# Patient Record
Sex: FEMALE | URBAN - METROPOLITAN AREA
[De-identification: names, ages, dates, MRNs, and addresses within clinical notes are randomized per-mention and may not be internally consistent; named-entity substitution may affect disease eponyms.]

---

## 2022-01-21 ENCOUNTER — OFFICE VISIT (OUTPATIENT)
Dept: URBAN - METROPOLITAN AREA CLINIC 128 | Facility: CLINIC | Age: 67
End: 2022-01-21

## 2022-02-08 ENCOUNTER — OFFICE VISIT (OUTPATIENT)
Dept: URBAN - METROPOLITAN AREA CLINIC 19 | Facility: CLINIC | Age: 67
End: 2022-02-08
Payer: COMMERCIAL

## 2022-02-08 VITALS
BODY MASS INDEX: 25.72 KG/M2 | WEIGHT: 154.4 LBS | SYSTOLIC BLOOD PRESSURE: 130 MMHG | HEART RATE: 92 BPM | OXYGEN SATURATION: 94 % | DIASTOLIC BLOOD PRESSURE: 82 MMHG | TEMPERATURE: 97.5 F | HEIGHT: 65 IN

## 2022-02-08 DIAGNOSIS — K31.7 GASTRIC POLYPS: ICD-10-CM

## 2022-02-08 DIAGNOSIS — D50.0 ANEMIA DUE TO CHRONIC BLOOD LOSS: ICD-10-CM

## 2022-02-08 DIAGNOSIS — Z86.010 HISTORY OF COLON POLYPS: ICD-10-CM

## 2022-02-08 PROBLEM — 428283002: Status: ACTIVE | Noted: 2022-02-08

## 2022-02-08 PROBLEM — 78809005: Status: ACTIVE | Noted: 2022-02-08

## 2022-02-08 PROCEDURE — 99204 OFFICE O/P NEW MOD 45 MIN: CPT | Performed by: INTERNAL MEDICINE

## 2022-02-08 RX ORDER — ACYCLOVIR 400 MG/1
1 TABLET TABLET ORAL TWICE A DAY
Status: ACTIVE | COMMUNITY

## 2022-02-08 RX ORDER — ESTRADIOL 10 UG/1
1 TABLET INSERT VAGINAL
Status: ACTIVE | COMMUNITY

## 2022-02-08 RX ORDER — IRBESARTAN 75 MG/1
1 TABLET TABLET, FILM COATED ORAL ONCE A DAY
Status: ACTIVE | COMMUNITY

## 2022-02-08 RX ORDER — TRAMADOL HYDROCHLORIDE AND ACETAMINOPHEN 37.5; 325 MG/1; MG/1
2 TABLETS AS NEEDED TABLET ORAL
Status: ACTIVE | COMMUNITY

## 2022-02-08 NOTE — HPI-TODAY'S VISIT:
is a 66 yr old woman new patient referred by Dr. Shae Benavides for anemia. She has had rheumatoid arthritis for 20 years and is on Embrel. Recent CBC showed Hb of 10.3. MCV 80, normal B12 and folate, no iron studies.  Prior hx of right hemicolectomy in 2004 for large benign colon polyp.  EGD 2014- multiple small gastric polyps throughout with cofee ground material in the stomach.  Colonoscopy - 3 mm single ulcer in karlos termina lileum, normal colon otherwise.  Both by Dr.Alan Henson.  has been on ultracet for a long time and has difficulty prepping for oclon with fair preps- take sa long time for her to clean out. no fever/no chills/no weight loss

## 2022-03-11 PROBLEM — 413533008: Status: ACTIVE | Noted: 2022-02-08

## 2022-03-25 ENCOUNTER — OFFICE VISIT (OUTPATIENT)
Dept: URBAN - METROPOLITAN AREA SURGERY CENTER 31 | Facility: SURGERY CENTER | Age: 67
End: 2022-03-25
Payer: COMMERCIAL

## 2022-03-25 ENCOUNTER — CLAIMS CREATED FROM THE CLAIM WINDOW (OUTPATIENT)
Dept: URBAN - METROPOLITAN AREA CLINIC 4 | Facility: CLINIC | Age: 67
End: 2022-03-25
Payer: COMMERCIAL

## 2022-03-25 DIAGNOSIS — K31.89 FOCAL FOVEOLAR HYPERPLASIA: ICD-10-CM

## 2022-03-25 DIAGNOSIS — K31.7 POLYP OF STOMACH AND DUODENUM: ICD-10-CM

## 2022-03-25 DIAGNOSIS — K31.7 BENIGN GASTRIC POLYP: ICD-10-CM

## 2022-03-25 DIAGNOSIS — K31.89 ACQUIRED DEFORMITY OF DUODENUM: ICD-10-CM

## 2022-03-25 DIAGNOSIS — D50.9 ANEMIA: ICD-10-CM

## 2022-03-25 PROCEDURE — 88312 SPECIAL STAINS GROUP 1: CPT | Performed by: PATHOLOGY

## 2022-03-25 PROCEDURE — G8907 PT DOC NO EVENTS ON DISCHARG: HCPCS | Performed by: INTERNAL MEDICINE

## 2022-03-25 PROCEDURE — 88305 TISSUE EXAM BY PATHOLOGIST: CPT | Performed by: PATHOLOGY

## 2022-03-25 PROCEDURE — 45378 DIAGNOSTIC COLONOSCOPY: CPT | Performed by: INTERNAL MEDICINE

## 2022-03-25 PROCEDURE — 43239 EGD BIOPSY SINGLE/MULTIPLE: CPT | Performed by: INTERNAL MEDICINE

## 2022-03-25 RX ORDER — ESTRADIOL 10 UG/1
1 TABLET INSERT VAGINAL
Status: ACTIVE | COMMUNITY

## 2022-03-25 RX ORDER — IRBESARTAN 75 MG/1
1 TABLET TABLET, FILM COATED ORAL ONCE A DAY
Status: ACTIVE | COMMUNITY

## 2022-03-25 RX ORDER — ACYCLOVIR 400 MG/1
1 TABLET TABLET ORAL TWICE A DAY
Status: ACTIVE | COMMUNITY

## 2022-03-25 RX ORDER — TRAMADOL HYDROCHLORIDE AND ACETAMINOPHEN 37.5; 325 MG/1; MG/1
2 TABLETS AS NEEDED TABLET ORAL
Status: ACTIVE | COMMUNITY

## 2022-04-19 ENCOUNTER — DASHBOARD ENCOUNTERS (OUTPATIENT)
Age: 67
End: 2022-04-19

## 2022-04-19 ENCOUNTER — WEB ENCOUNTER (OUTPATIENT)
Dept: URBAN - METROPOLITAN AREA CLINIC 19 | Facility: CLINIC | Age: 67
End: 2022-04-19

## 2022-04-19 ENCOUNTER — OFFICE VISIT (OUTPATIENT)
Dept: URBAN - METROPOLITAN AREA CLINIC 19 | Facility: CLINIC | Age: 67
End: 2022-04-19
Payer: COMMERCIAL

## 2022-04-19 ENCOUNTER — LAB OUTSIDE AN ENCOUNTER (OUTPATIENT)
Dept: URBAN - METROPOLITAN AREA CLINIC 19 | Facility: CLINIC | Age: 67
End: 2022-04-19

## 2022-04-19 DIAGNOSIS — D50.9 IRON DEFICIENCY ANEMIA, UNSPECIFIED IRON DEFICIENCY ANEMIA TYPE: ICD-10-CM

## 2022-04-19 PROCEDURE — 99213 OFFICE O/P EST LOW 20 MIN: CPT | Performed by: NURSE PRACTITIONER

## 2022-04-19 RX ORDER — ESTRADIOL 10 UG/1
1 TABLET INSERT VAGINAL
Status: ACTIVE | COMMUNITY

## 2022-04-19 RX ORDER — TRAMADOL HYDROCHLORIDE AND ACETAMINOPHEN 37.5; 325 MG/1; MG/1
2 TABLETS AS NEEDED TABLET ORAL
Status: ACTIVE | COMMUNITY

## 2022-04-19 RX ORDER — ACYCLOVIR 400 MG/1
1 TABLET TABLET ORAL TWICE A DAY
Status: ACTIVE | COMMUNITY

## 2022-04-19 RX ORDER — IRBESARTAN 75 MG/1
1 TABLET TABLET, FILM COATED ORAL ONCE A DAY
Status: ACTIVE | COMMUNITY

## 2022-04-19 NOTE — HPI-TODAY'S VISIT:
2/8/22 Dr. Noriega: is a 66 yr old woman new patient referred by Dr. Shae Benavides for anemia. She has had rheumatoid arthritis for 20 years and is on Embrel. Recent CBC showed Hb of 10.3. MCV 80, normal B12 and folate, no iron studies.  Prior hx of right hemicolectomy in 2004 for large benign colon polyp.  EGD 2014- multiple small gastric polyps throughout with cofee ground material in the stomach.  Colonoscopy - 3 mm single ulcer in karlos termina lileum, normal colon otherwise.  Both by Dr.Alan Henson.  has been on ultracet for a long time and has difficulty prepping for oclon with fair preps- take sa long time for her to clean out.  Colonoscopy with Dr. Noriega 3/25/2022-preparation of the colon was fair.  Patent end-to-side ileocolonic anastomosis, characterized by healthy-appearing mucosa.  Diverticulosis in sigmoid colon.  The distal rectum and anal verge are normal on retroflexion view.  Repeat colonoscopy in 3 years.  See with PillCam if pathology normal.  EGD with Dr. Noriega 3/25/2022-normal esophagus, multiple gastric polyps, chronic nonerosive gastritis, and normal examined duodenum.    Pathology-biopsy of stomach and duodenum with no significant abnormality.  Stomach polyp was a fundic gland polyp.  4/19/22: Patient reports DEBRA for 6 months now. Advised to hold off on iron until after procedures. Patient states she had labs checked right before procedures in March. No blood in stool or black stool. Patient reports she is having a bowel movement 5 times a day week. No blood in stool or abdominal pain. On miralax and fiber daily.

## 2022-05-04 ENCOUNTER — OFFICE VISIT (OUTPATIENT)
Dept: URBAN - METROPOLITAN AREA CLINIC 127 | Facility: CLINIC | Age: 67
End: 2022-05-04

## 2022-05-06 ENCOUNTER — TELEPHONE ENCOUNTER (OUTPATIENT)
Dept: URBAN - METROPOLITAN AREA CLINIC 19 | Facility: CLINIC | Age: 67
End: 2022-05-06

## 2022-05-09 PROBLEM — 87522002: Status: ACTIVE | Noted: 2022-04-19

## 2022-05-11 ENCOUNTER — OFFICE VISIT (OUTPATIENT)
Dept: URBAN - METROPOLITAN AREA CLINIC 127 | Facility: CLINIC | Age: 67
End: 2022-05-11

## 2022-05-25 ENCOUNTER — OFFICE VISIT (OUTPATIENT)
Dept: URBAN - METROPOLITAN AREA CLINIC 127 | Facility: CLINIC | Age: 67
End: 2022-05-25
Payer: COMMERCIAL

## 2022-05-25 DIAGNOSIS — D50.9 IRON DEFICIENCY ANEMIA: ICD-10-CM

## 2022-05-25 PROCEDURE — 91110 GI TRC IMG INTRAL ESOPH-ILE: CPT | Performed by: STUDENT IN AN ORGANIZED HEALTH CARE EDUCATION/TRAINING PROGRAM

## 2022-05-26 ENCOUNTER — TELEPHONE ENCOUNTER (OUTPATIENT)
Dept: URBAN - METROPOLITAN AREA CLINIC 92 | Facility: CLINIC | Age: 67
End: 2022-05-26

## 2022-06-20 ENCOUNTER — TELEPHONE ENCOUNTER (OUTPATIENT)
Dept: URBAN - METROPOLITAN AREA CLINIC 19 | Facility: CLINIC | Age: 67
End: 2022-06-20

## 2022-06-22 PROBLEM — 87522002 IRON DEFICIENCY ANEMIA: Status: ACTIVE | Noted: 2022-06-22

## 2022-12-29 ENCOUNTER — TELEPHONE ENCOUNTER (OUTPATIENT)
Dept: URBAN - METROPOLITAN AREA CLINIC 19 | Facility: CLINIC | Age: 67
End: 2022-12-29

## 2024-10-28 ENCOUNTER — OFFICE VISIT (OUTPATIENT)
Dept: URBAN - METROPOLITAN AREA CLINIC 2 | Facility: CLINIC | Age: 69
End: 2024-10-28
Payer: MEDICARE

## 2024-10-28 VITALS
TEMPERATURE: 97 F | BODY MASS INDEX: 26.39 KG/M2 | HEIGHT: 65 IN | WEIGHT: 158.4 LBS | HEART RATE: 74 BPM | DIASTOLIC BLOOD PRESSURE: 85 MMHG | SYSTOLIC BLOOD PRESSURE: 150 MMHG

## 2024-10-28 DIAGNOSIS — K21.9 CHRONIC GERD: ICD-10-CM

## 2024-10-28 PROBLEM — 235595009: Status: ACTIVE | Noted: 2024-10-28

## 2024-10-28 PROCEDURE — 99213 OFFICE O/P EST LOW 20 MIN: CPT | Performed by: NURSE PRACTITIONER

## 2024-10-28 RX ORDER — ETANERCEPT 50 MG/ML
SOLUTION SUBCUTANEOUS
Qty: 4 MILLILITER | Status: ACTIVE | COMMUNITY

## 2024-10-28 RX ORDER — TRAMADOL HYDROCHLORIDE AND ACETAMINOPHEN 37.5; 325 MG/1; MG/1
2 TABLETS AS NEEDED TABLET ORAL
Status: DISCONTINUED | COMMUNITY

## 2024-10-28 RX ORDER — ACYCLOVIR 400 MG/1
1 TABLET TABLET ORAL TWICE A DAY
Status: ACTIVE | COMMUNITY

## 2024-10-28 RX ORDER — SIMVASTATIN 40 MG/1
TABLET, FILM COATED ORAL
Qty: 90 TABLET | Status: ACTIVE | COMMUNITY

## 2024-10-28 RX ORDER — OMEPRAZOLE 40 MG/1
CAPSULE, DELAYED RELEASE ORAL
Qty: 90 CAPSULE | Status: ACTIVE | COMMUNITY

## 2024-10-28 RX ORDER — ESTRADIOL 10 UG/1
1 TABLET INSERT VAGINAL
Status: ACTIVE | COMMUNITY

## 2024-10-28 RX ORDER — ESTRADIOL 10 UG/1
TABLET VAGINAL
Qty: 36 TABLET | Status: ACTIVE | COMMUNITY

## 2024-10-28 RX ORDER — IRBESARTAN 75 MG/1
1 TABLET TABLET, FILM COATED ORAL ONCE A DAY
Status: ACTIVE | COMMUNITY

## 2024-10-28 RX ORDER — FAMOTIDINE 40 MG/1
1 TABLET AT BEDTIME TABLET, FILM COATED ORAL ONCE A DAY
Qty: 30 TABLET | Refills: 3 | OUTPATIENT
Start: 2024-10-28

## 2024-10-28 RX ORDER — METRONIDAZOLE 7.5 MG/G
GEL TOPICAL
Qty: 45 GRAM | Status: ACTIVE | COMMUNITY

## 2024-10-28 NOTE — HPI-TODAY'S VISIT:
VeryPleasant 69-year-old female seen today with complaints of heartburn and reflux.  Patient does have a history of anemia and did have an EGD in March 2022 that was notable for normal esophagus, multiple fundic gland polyps, chronic nonerosive gastritis, and a normal duodenum.  Patient has done well from a GI perspective since her last office visit in 2022 until about 10 days ago.  She was having worsening heartburn and reflux and was not able to sleep lying flat.  She also developed a sore throat last Wednesday.  She had an atypical mole removed from her toe and was given Keflex.  Since being on this antibiotic the sore throat resolved.  Patient has gained about 10 pounds and has been eating more sweets recently.  She had been taking omeprazole 40 mg every morning.  However in the past 10 days she is taking increasing amounts of Gaviscon.  Symptoms have been slowly improving.  She has been told that she has a small hiatal hernia.

## 2024-12-02 ENCOUNTER — OFFICE VISIT (OUTPATIENT)
Dept: URBAN - METROPOLITAN AREA CLINIC 2 | Facility: CLINIC | Age: 69
End: 2024-12-02
Payer: MEDICARE

## 2024-12-02 VITALS
HEIGHT: 65 IN | TEMPERATURE: 97.5 F | BODY MASS INDEX: 26.33 KG/M2 | WEIGHT: 158 LBS | SYSTOLIC BLOOD PRESSURE: 129 MMHG | DIASTOLIC BLOOD PRESSURE: 79 MMHG | HEART RATE: 73 BPM

## 2024-12-02 DIAGNOSIS — K21.9 CHRONIC GERD: ICD-10-CM

## 2024-12-02 PROCEDURE — 99213 OFFICE O/P EST LOW 20 MIN: CPT | Performed by: NURSE PRACTITIONER

## 2024-12-02 RX ORDER — ESTRADIOL 10 UG/1
TABLET VAGINAL
Qty: 36 TABLET | Status: ACTIVE | COMMUNITY

## 2024-12-02 RX ORDER — FAMOTIDINE 40 MG/1
1 TABLET AT BEDTIME TABLET, FILM COATED ORAL ONCE A DAY
Qty: 30 TABLET | Refills: 3 | Status: ACTIVE | COMMUNITY
Start: 2024-10-28

## 2024-12-02 RX ORDER — METRONIDAZOLE 7.5 MG/G
GEL TOPICAL
Qty: 45 GRAM | Status: ACTIVE | COMMUNITY

## 2024-12-02 RX ORDER — ESTRADIOL 10 UG/1
1 TABLET INSERT VAGINAL
Status: ACTIVE | COMMUNITY

## 2024-12-02 RX ORDER — ETANERCEPT 50 MG/ML
SOLUTION SUBCUTANEOUS
Qty: 4 MILLILITER | Status: ACTIVE | COMMUNITY

## 2024-12-02 RX ORDER — SIMVASTATIN 40 MG/1
TABLET, FILM COATED ORAL
Qty: 90 TABLET | Status: ACTIVE | COMMUNITY

## 2024-12-02 RX ORDER — ACYCLOVIR 400 MG/1
1 TABLET TABLET ORAL TWICE A DAY
Status: ACTIVE | COMMUNITY

## 2024-12-02 RX ORDER — OMEPRAZOLE 40 MG/1
CAPSULE, DELAYED RELEASE ORAL
Qty: 90 CAPSULE | Status: ACTIVE | COMMUNITY

## 2024-12-02 RX ORDER — IRBESARTAN 75 MG/1
1 TABLET TABLET, FILM COATED ORAL ONCE A DAY
Status: ACTIVE | COMMUNITY

## 2024-12-02 RX ORDER — FAMOTIDINE 40 MG/1
1 TABLET AT BEDTIME TABLET, FILM COATED ORAL ONCE A DAY
Qty: 30 TABLET | Refills: 3 | OUTPATIENT

## 2024-12-02 NOTE — HPI-TODAY'S VISIT:
69-year-old female seen in follow-up today after a visit in October with worsening heartburn and reflux.  She does have a history of chronic nonerosive gastritis.  She takes omeprazole.  Patient had been on an antibiotic prior to onset of symptoms.  Since then heartburn and reflux have been poorly controlled and she was taking Gaviscon to help with her symptoms.  At her visit in October we continued omeprazole but did add famotidine at bedtime.  She had been gaining weight so we suggested losing 5 to 10 pounds.  Patient is seen in follow-up today. She continues to have poorly controlled gerd. despite dual therapy. Denies dysphagia. Does not take nsaids.

## 2025-01-17 ENCOUNTER — OFFICE VISIT (OUTPATIENT)
Dept: URBAN - METROPOLITAN AREA CLINIC 128 | Facility: CLINIC | Age: 70
End: 2025-01-17

## 2025-01-24 ENCOUNTER — OFFICE VISIT (OUTPATIENT)
Dept: URBAN - METROPOLITAN AREA CLINIC 128 | Facility: CLINIC | Age: 70
End: 2025-01-24
Payer: MEDICARE

## 2025-01-24 VITALS
HEIGHT: 65 IN | WEIGHT: 156 LBS | DIASTOLIC BLOOD PRESSURE: 62 MMHG | TEMPERATURE: 97 F | SYSTOLIC BLOOD PRESSURE: 116 MMHG | BODY MASS INDEX: 25.99 KG/M2 | HEART RATE: 82 BPM

## 2025-01-24 DIAGNOSIS — K21.9 CHRONIC GERD: ICD-10-CM

## 2025-01-24 PROCEDURE — 99213 OFFICE O/P EST LOW 20 MIN: CPT | Performed by: INTERNAL MEDICINE

## 2025-01-24 RX ORDER — ESTRADIOL 10 UG/1
1 TABLET INSERT VAGINAL
Status: ACTIVE | COMMUNITY

## 2025-01-24 RX ORDER — SIMVASTATIN 40 MG/1
TABLET, FILM COATED ORAL
Qty: 90 TABLET | Status: ACTIVE | COMMUNITY

## 2025-01-24 RX ORDER — FAMOTIDINE 40 MG/1
1 TABLET AT BEDTIME TABLET, FILM COATED ORAL ONCE A DAY
Qty: 30 TABLET | Refills: 3 | OUTPATIENT

## 2025-01-24 RX ORDER — ACYCLOVIR 400 MG/1
1 TABLET TABLET ORAL TWICE A DAY
Status: ACTIVE | COMMUNITY

## 2025-01-24 RX ORDER — FAMOTIDINE 40 MG/1
1 TABLET AT BEDTIME TABLET, FILM COATED ORAL ONCE A DAY
Qty: 30 TABLET | Refills: 3 | Status: ACTIVE | COMMUNITY

## 2025-01-24 RX ORDER — IRBESARTAN 75 MG/1
1 TABLET TABLET, FILM COATED ORAL ONCE A DAY
Status: ACTIVE | COMMUNITY

## 2025-01-24 RX ORDER — OMEPRAZOLE 40 MG/1
CAPSULE, DELAYED RELEASE ORAL
Qty: 90 CAPSULE | Status: ACTIVE | COMMUNITY

## 2025-01-24 RX ORDER — ESTRADIOL 10 UG/1
TABLET VAGINAL
Qty: 36 TABLET | Status: ACTIVE | COMMUNITY

## 2025-01-24 RX ORDER — METRONIDAZOLE 7.5 MG/G
GEL TOPICAL
Qty: 45 GRAM | Status: ACTIVE | COMMUNITY

## 2025-01-24 RX ORDER — ETANERCEPT 50 MG/ML
SOLUTION SUBCUTANEOUS
Qty: 4 MILLILITER | Status: ACTIVE | COMMUNITY

## 2025-01-24 NOTE — PHYSICAL EXAM GASTROINTESTINAL
Abdomen , soft, nontender, nondistended , no guarding or rigidity , no masses palpable , normal bowel sounds , Liver and Spleen , no hepatomegaly present , no hepatosplenomegaly , liver nontender , spleen not palpable
all other ROS negative except as per HPI

## 2025-01-24 NOTE — HPI-TODAY'S VISIT:
69-year-old female seen in follow-up today after a visit in October with worsening heartburn and reflux.  She does have a history of chronic nonerosive gastritis.  She takes omeprazole.  Patient had been on an antibiotic prior to onset of symptoms.  Since then heartburn and reflux have been poorly controlled and she was taking Gaviscon to help with her symptoms.  At her visit in October we continued omeprazole but did add famotidine at bedtime.  She had been gaining weight so we suggested losing 5 to 10 pounds.  Patient is seen in follow-up today. She continues to have poorly controlled gerd. despite dual therapy. Denies dysphagia. Does not take nsaids.  1/24/25 Here for GERD flare. Still not under control despite Omeprazole as well as famotidine. Does admit she had more chocolcate and cansdy starting with halloween time when this flare started. Using Gaviscon as well.

## 2025-02-12 ENCOUNTER — TELEPHONE ENCOUNTER (OUTPATIENT)
Dept: URBAN - METROPOLITAN AREA SURGERY CENTER 31 | Facility: SURGERY CENTER | Age: 70
End: 2025-02-12

## 2025-02-13 ENCOUNTER — CLAIMS CREATED FROM THE CLAIM WINDOW (OUTPATIENT)
Dept: URBAN - METROPOLITAN AREA SURGERY CENTER 31 | Facility: SURGERY CENTER | Age: 70
End: 2025-02-13
Payer: MEDICARE

## 2025-02-13 ENCOUNTER — CLAIMS CREATED FROM THE CLAIM WINDOW (OUTPATIENT)
Dept: URBAN - METROPOLITAN AREA CLINIC 4 | Facility: CLINIC | Age: 70
End: 2025-02-13
Payer: MEDICARE

## 2025-02-13 DIAGNOSIS — K29.70 ERYTHEMATOUS GASTROPATHY: ICD-10-CM

## 2025-02-13 DIAGNOSIS — K21.9 ACID REFLUX: ICD-10-CM

## 2025-02-13 DIAGNOSIS — K29.70 GASTRITIS, UNSPECIFIED, WITHOUT BLEEDING: ICD-10-CM

## 2025-02-13 DIAGNOSIS — K31.89 OTHER DISEASES OF STOMACH AND DUODENUM: ICD-10-CM

## 2025-02-13 DIAGNOSIS — K31.89 ACHYLIA: ICD-10-CM

## 2025-02-13 DIAGNOSIS — K21.9 GASTRO-ESOPHAGEAL REFLUX DISEASE WITHOUT ESOPHAGITIS: ICD-10-CM

## 2025-02-13 PROCEDURE — 88305 TISSUE EXAM BY PATHOLOGIST: CPT | Performed by: PATHOLOGY

## 2025-02-13 PROCEDURE — 00731 ANES UPR GI NDSC PX NOS: CPT | Performed by: NURSE ANESTHETIST, CERTIFIED REGISTERED

## 2025-02-13 PROCEDURE — 88312 SPECIAL STAINS GROUP 1: CPT | Performed by: PATHOLOGY

## 2025-02-13 PROCEDURE — 43239 EGD BIOPSY SINGLE/MULTIPLE: CPT | Performed by: INTERNAL MEDICINE

## 2025-02-13 RX ORDER — ESTRADIOL 10 UG/1
TABLET VAGINAL
Qty: 36 TABLET | Status: ACTIVE | COMMUNITY

## 2025-02-13 RX ORDER — SIMVASTATIN 40 MG/1
TABLET, FILM COATED ORAL
Qty: 90 TABLET | Status: ACTIVE | COMMUNITY

## 2025-02-13 RX ORDER — OMEPRAZOLE 40 MG/1
1 CAPSULE 30 MINUTES BEFORE MEAL CAPSULE, DELAYED RELEASE ORAL TWICE A DAY
Qty: 180 | Refills: 2

## 2025-02-13 RX ORDER — IRBESARTAN 75 MG/1
1 TABLET TABLET, FILM COATED ORAL ONCE A DAY
Status: ACTIVE | COMMUNITY

## 2025-02-13 RX ORDER — ETANERCEPT 50 MG/ML
SOLUTION SUBCUTANEOUS
Qty: 4 MILLILITER | Status: ACTIVE | COMMUNITY

## 2025-02-13 RX ORDER — ACYCLOVIR 400 MG/1
1 TABLET TABLET ORAL TWICE A DAY
Status: ACTIVE | COMMUNITY

## 2025-02-13 RX ORDER — OMEPRAZOLE 40 MG/1
CAPSULE, DELAYED RELEASE ORAL
Qty: 90 CAPSULE | Status: ACTIVE | COMMUNITY

## 2025-02-13 RX ORDER — METRONIDAZOLE 7.5 MG/G
GEL TOPICAL
Qty: 45 GRAM | Status: ACTIVE | COMMUNITY

## 2025-02-13 RX ORDER — FAMOTIDINE 40 MG/1
1 TABLET AT BEDTIME TABLET, FILM COATED ORAL ONCE A DAY
Qty: 30 TABLET | Refills: 3 | Status: ACTIVE | COMMUNITY

## 2025-02-13 RX ORDER — ESTRADIOL 10 UG/1
1 TABLET INSERT VAGINAL
Status: ACTIVE | COMMUNITY

## 2025-02-19 ENCOUNTER — P2P PATIENT RECORD (OUTPATIENT)
Age: 70
End: 2025-02-19

## 2025-03-11 PROBLEM — 266435005: Status: ACTIVE | Noted: 2025-03-11

## 2025-03-13 ENCOUNTER — OFFICE VISIT (OUTPATIENT)
Dept: URBAN - METROPOLITAN AREA CLINIC 128 | Facility: CLINIC | Age: 70
End: 2025-03-13
Payer: MEDICARE

## 2025-03-13 VITALS
DIASTOLIC BLOOD PRESSURE: 76 MMHG | TEMPERATURE: 96.8 F | BODY MASS INDEX: 26.23 KG/M2 | HEART RATE: 87 BPM | WEIGHT: 157.4 LBS | SYSTOLIC BLOOD PRESSURE: 124 MMHG | HEIGHT: 65 IN

## 2025-03-13 DIAGNOSIS — Z86.0100 PERSONAL HISTORY OF COLONIC POLYPS: ICD-10-CM

## 2025-03-13 DIAGNOSIS — K21.9 GASTROESOPHAGEAL REFLUX DISEASE WITHOUT ESOPHAGITIS: ICD-10-CM

## 2025-03-13 PROCEDURE — 99214 OFFICE O/P EST MOD 30 MIN: CPT | Performed by: PHYSICIAN ASSISTANT

## 2025-03-13 RX ORDER — SIMVASTATIN 40 MG/1
TABLET, FILM COATED ORAL
Qty: 90 TABLET | Status: ACTIVE | COMMUNITY

## 2025-03-13 RX ORDER — OMEPRAZOLE 40 MG/1
1 CAPSULE 30 MINUTES BEFORE MEAL CAPSULE, DELAYED RELEASE ORAL TWICE A DAY
Qty: 180 | Refills: 2 | Status: ACTIVE | COMMUNITY

## 2025-03-13 RX ORDER — POLYETHYLENE GLYCOL 3350, SODIUM SULFATE ANHYDROUS, SODIUM BICARBONATE, SODIUM CHLORIDE, POTASSIUM CHLORIDE 236; 22.74; 6.74; 5.86; 2.97 G/4L; G/4L; G/4L; G/4L; G/4L
POWDER, FOR SOLUTION ORAL ONCE A DAY
Qty: 1 KIT | Refills: 0 | OUTPATIENT
Start: 2025-03-13 | End: 2025-03-14

## 2025-03-13 RX ORDER — METRONIDAZOLE 7.5 MG/G
GEL TOPICAL
Qty: 45 GRAM | Status: ACTIVE | COMMUNITY

## 2025-03-13 RX ORDER — ACYCLOVIR 400 MG/1
1 TABLET TABLET ORAL TWICE A DAY
Status: ACTIVE | COMMUNITY

## 2025-03-13 RX ORDER — DENOSUMAB 60 MG/ML
AS DIRECTED INJECTION SUBCUTANEOUS
Status: ACTIVE | COMMUNITY

## 2025-03-13 RX ORDER — ETANERCEPT 50 MG/ML
SOLUTION SUBCUTANEOUS
Qty: 4 MILLILITER | Status: ACTIVE | COMMUNITY

## 2025-03-13 RX ORDER — ESTRADIOL 10 UG/1
1 TABLET INSERT VAGINAL
Status: ACTIVE | COMMUNITY

## 2025-03-13 RX ORDER — FAMOTIDINE 40 MG/1
1 TABLET AT BEDTIME TABLET, FILM COATED ORAL ONCE A DAY
Qty: 30 TABLET | Refills: 3 | Status: ON HOLD | COMMUNITY

## 2025-03-13 RX ORDER — IRBESARTAN 75 MG/1
1 TABLET TABLET, FILM COATED ORAL ONCE A DAY
Status: ACTIVE | COMMUNITY

## 2025-03-13 NOTE — HPI-TODAY'S VISIT:
69-year-old female seen in follow-up today after a visit in October with improved heartburn and reflux. She is doing omeprazole twice a day now and only takes gaviscon every now and then.  She does have a history of chronic nonerosive gastritis.  She had been gaining weight so we suggested losing 5 to 10 pounds.  Patient is seen in follow-up today. Denies dysphagia. Does not take nsaids. EGD 2025 revealed: (A) Stomach, Random, Biopsy (Cold Forceps): MILD CHEMICAL/REACTIVE GASTROPATHY. PROTON PUMP INHIBITOR EFFECT. No Evidence of H. Pylori Organisms or Intestinal Metaplasia. Negative for Dysplasia or Malignancy. (B) Gastroesophageal Junction, Biopsy (Cold Forceps): SQUAMOCOLUMNAR MUCOSA WITH REFLUX-TYPE CHANGES. No Evidence of Cardenas's Esophagus or Eosinophilic Esophagitis. Negative for Infectious organisms, Dysplasia or Malignancy.

## 2025-04-08 ENCOUNTER — OFFICE VISIT (OUTPATIENT)
Dept: URBAN - METROPOLITAN AREA CLINIC 84 | Facility: CLINIC | Age: 70
End: 2025-04-08

## 2025-04-24 ENCOUNTER — OFFICE VISIT (OUTPATIENT)
Dept: URBAN - METROPOLITAN AREA SURGERY CENTER 31 | Facility: SURGERY CENTER | Age: 70
End: 2025-04-24

## 2025-05-06 ENCOUNTER — P2P PATIENT RECORD (OUTPATIENT)
Age: 70
End: 2025-05-06

## 2025-05-22 ENCOUNTER — OFFICE VISIT (OUTPATIENT)
Dept: URBAN - METROPOLITAN AREA CLINIC 128 | Facility: CLINIC | Age: 70
End: 2025-05-22

## 2025-05-22 ENCOUNTER — OFFICE VISIT (OUTPATIENT)
Dept: URBAN - METROPOLITAN AREA SURGERY CENTER 31 | Facility: SURGERY CENTER | Age: 70
End: 2025-05-22

## 2025-05-29 ENCOUNTER — OFFICE VISIT (OUTPATIENT)
Dept: URBAN - METROPOLITAN AREA SURGERY CENTER 31 | Facility: SURGERY CENTER | Age: 70
End: 2025-05-29

## 2025-05-29 RX ORDER — ACYCLOVIR 400 MG/1
1 TABLET TABLET ORAL TWICE A DAY
Status: ACTIVE | COMMUNITY

## 2025-05-29 RX ORDER — SIMVASTATIN 40 MG/1
TABLET, FILM COATED ORAL
Qty: 90 TABLET | Status: ACTIVE | COMMUNITY

## 2025-05-29 RX ORDER — FAMOTIDINE 40 MG/1
1 TABLET AT BEDTIME TABLET, FILM COATED ORAL ONCE A DAY
Qty: 30 TABLET | Refills: 3 | Status: ON HOLD | COMMUNITY

## 2025-05-29 RX ORDER — IRBESARTAN 75 MG/1
1 TABLET TABLET, FILM COATED ORAL ONCE A DAY
Status: ACTIVE | COMMUNITY

## 2025-05-29 RX ORDER — OMEPRAZOLE 40 MG/1
1 CAPSULE 30 MINUTES BEFORE MEAL CAPSULE, DELAYED RELEASE ORAL TWICE A DAY
Qty: 180 | Refills: 2 | Status: ACTIVE | COMMUNITY

## 2025-05-29 RX ORDER — METRONIDAZOLE 7.5 MG/G
GEL TOPICAL
Qty: 45 GRAM | Status: ACTIVE | COMMUNITY

## 2025-05-29 RX ORDER — ESTRADIOL 10 UG/1
1 TABLET INSERT VAGINAL
Status: ACTIVE | COMMUNITY

## 2025-05-29 RX ORDER — DENOSUMAB 60 MG/ML
AS DIRECTED INJECTION SUBCUTANEOUS
Status: ACTIVE | COMMUNITY

## 2025-05-29 RX ORDER — ETANERCEPT 50 MG/ML
SOLUTION SUBCUTANEOUS
Qty: 4 MILLILITER | Status: ACTIVE | COMMUNITY

## 2025-06-19 ENCOUNTER — OFFICE VISIT (OUTPATIENT)
Dept: URBAN - METROPOLITAN AREA CLINIC 128 | Facility: CLINIC | Age: 70
End: 2025-06-19
Payer: MEDICARE

## 2025-06-19 DIAGNOSIS — K21.9 GASTROESOPHAGEAL REFLUX DISEASE WITHOUT ESOPHAGITIS: ICD-10-CM

## 2025-06-19 DIAGNOSIS — Z86.0100 PERSONAL HISTORY OF COLONIC POLYPS: ICD-10-CM

## 2025-06-19 PROCEDURE — 99213 OFFICE O/P EST LOW 20 MIN: CPT | Performed by: PHYSICIAN ASSISTANT

## 2025-06-19 RX ORDER — IRBESARTAN 75 MG/1
1 TABLET TABLET, FILM COATED ORAL ONCE A DAY
Status: ACTIVE | COMMUNITY

## 2025-06-19 RX ORDER — DENOSUMAB 60 MG/ML
AS DIRECTED INJECTION SUBCUTANEOUS
Status: ACTIVE | COMMUNITY

## 2025-06-19 RX ORDER — METRONIDAZOLE 7.5 MG/G
GEL TOPICAL
Qty: 45 GRAM | Status: ACTIVE | COMMUNITY

## 2025-06-19 RX ORDER — ETANERCEPT 50 MG/ML
SOLUTION SUBCUTANEOUS
Qty: 4 MILLILITER | Status: ACTIVE | COMMUNITY

## 2025-06-19 RX ORDER — SIMVASTATIN 40 MG/1
TABLET, FILM COATED ORAL
Qty: 90 TABLET | Status: ACTIVE | COMMUNITY

## 2025-06-19 RX ORDER — ACYCLOVIR 400 MG/1
1 TABLET TABLET ORAL TWICE A DAY
Status: ACTIVE | COMMUNITY

## 2025-06-19 RX ORDER — OMEPRAZOLE 40 MG/1
1 CAPSULE 30 MINUTES BEFORE MEAL CAPSULE, DELAYED RELEASE ORAL TWICE A DAY
Qty: 180 | Refills: 2 | Status: ACTIVE | COMMUNITY

## 2025-06-19 RX ORDER — ESTRADIOL 10 UG/1
1 TABLET INSERT VAGINAL
Status: ACTIVE | COMMUNITY

## 2025-06-19 RX ORDER — FAMOTIDINE 40 MG/1
1 TABLET AT BEDTIME TABLET, FILM COATED ORAL ONCE A DAY
Qty: 30 TABLET | Refills: 3 | Status: ON HOLD | COMMUNITY

## 2025-06-19 RX ORDER — FAMOTIDINE 20 MG/1
1 TABLET AT BEDTIME TABLET, FILM COATED ORAL ONCE A DAY
Qty: 90 TABLET | Refills: 3 | OUTPATIENT
Start: 2025-06-19

## 2025-06-19 NOTE — HPI-TODAY'S VISIT:
69-year-old female seen in follow-up today after a visit in October with improved heartburn and reflux. She is doing omeprazole twice a day now and only takes gaviscon every now and then.  She does have a history of chronic nonerosive gastritis.  She had been gaining weight so we suggested losing 5 to 10 pounds.  Patient is seen in follow-up today. Denies dysphagia. Does not take nsaids. EGD 2025 revealed: (A) Stomach, Random, Biopsy (Cold Forceps): MILD CHEMICAL/REACTIVE GASTROPATHY. PROTON PUMP INHIBITOR EFFECT. No Evidence of H. Pylori Organisms or Intestinal Metaplasia. Negative for Dysplasia or Malignancy. (B) Gastroesophageal Junction, Biopsy (Cold Forceps): SQUAMOCOLUMNAR MUCOSA WITH REFLUX-TYPE CHANGES. No Evidence of Cardenas's Esophagus or Eosinophilic Esophagitis. Negative for Infectious organisms, Dysplasia or Malignancy.  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 See Comment. Negative for Infectious Organisms, Dysplasia or Malignancy., to be repeated in 5 years

## 2025-06-23 ENCOUNTER — WEB ENCOUNTER (OUTPATIENT)
Dept: URBAN - METROPOLITAN AREA CLINIC 128 | Facility: CLINIC | Age: 70
End: 2025-06-23

## 2025-06-23 RX ORDER — FAMOTIDINE 40 MG/1
1 TABLET AT BEDTIME TABLET, FILM COATED ORAL ONCE A DAY
Qty: 90 TABLET | Refills: 3
Start: 2025-06-19

## 2025-06-26 ENCOUNTER — OFFICE VISIT (OUTPATIENT)
Dept: URBAN - METROPOLITAN AREA CLINIC 128 | Facility: CLINIC | Age: 70
End: 2025-06-26

## 2025-07-29 ENCOUNTER — P2P PATIENT RECORD (OUTPATIENT)
Age: 70
End: 2025-07-29